# Patient Record
Sex: MALE | Race: BLACK OR AFRICAN AMERICAN | NOT HISPANIC OR LATINO | URBAN - METROPOLITAN AREA
[De-identification: names, ages, dates, MRNs, and addresses within clinical notes are randomized per-mention and may not be internally consistent; named-entity substitution may affect disease eponyms.]

---

## 2022-10-01 ENCOUNTER — EMERGENCY (EMERGENCY)
Facility: HOSPITAL | Age: 42
LOS: 1 days | Discharge: HOME | End: 2022-10-01
Attending: STUDENT IN AN ORGANIZED HEALTH CARE EDUCATION/TRAINING PROGRAM
Payer: MEDICAID

## 2022-10-01 VITALS
OXYGEN SATURATION: 97 % | DIASTOLIC BLOOD PRESSURE: 137 MMHG | SYSTOLIC BLOOD PRESSURE: 171 MMHG | RESPIRATION RATE: 18 BRPM | TEMPERATURE: 99 F | HEART RATE: 116 BPM

## 2022-10-01 VITALS
SYSTOLIC BLOOD PRESSURE: 150 MMHG | OXYGEN SATURATION: 98 % | DIASTOLIC BLOOD PRESSURE: 99 MMHG | RESPIRATION RATE: 18 BRPM | HEART RATE: 91 BPM | TEMPERATURE: 98 F

## 2022-10-01 PROCEDURE — 73700 CT LOWER EXTREMITY W/O DYE: CPT | Mod: 26,LT,MA

## 2022-10-01 PROCEDURE — 27780 TREATMENT OF FIBULA FRACTURE: CPT | Mod: 54,LT

## 2022-10-01 PROCEDURE — 73030 X-RAY EXAM OF SHOULDER: CPT

## 2022-10-01 PROCEDURE — 73610 X-RAY EXAM OF ANKLE: CPT | Mod: 26,LT

## 2022-10-01 PROCEDURE — 99285 EMERGENCY DEPT VISIT HI MDM: CPT | Mod: 25

## 2022-10-01 PROCEDURE — 29515 APPLICATION SHORT LEG SPLINT: CPT | Mod: LT

## 2022-10-01 PROCEDURE — 73610 X-RAY EXAM OF ANKLE: CPT

## 2022-10-01 PROCEDURE — 73630 X-RAY EXAM OF FOOT: CPT | Mod: 26,LT

## 2022-10-01 PROCEDURE — 73030 X-RAY EXAM OF SHOULDER: CPT | Mod: 26,LT

## 2022-10-01 PROCEDURE — 90471 IMMUNIZATION ADMIN: CPT

## 2022-10-01 PROCEDURE — 90715 TDAP VACCINE 7 YRS/> IM: CPT

## 2022-10-01 PROCEDURE — 99285 EMERGENCY DEPT VISIT HI MDM: CPT | Mod: 57

## 2022-10-01 PROCEDURE — 73630 X-RAY EXAM OF FOOT: CPT

## 2022-10-01 PROCEDURE — 73700 CT LOWER EXTREMITY W/O DYE: CPT | Mod: MA

## 2022-10-01 RX ORDER — TETANUS TOXOID, REDUCED DIPHTHERIA TOXOID AND ACELLULAR PERTUSSIS VACCINE, ADSORBED 5; 2.5; 8; 8; 2.5 [IU]/.5ML; [IU]/.5ML; UG/.5ML; UG/.5ML; UG/.5ML
0.5 SUSPENSION INTRAMUSCULAR ONCE
Refills: 0 | Status: COMPLETED | OUTPATIENT
Start: 2022-10-01 | End: 2022-10-01

## 2022-10-01 RX ORDER — IBUPROFEN 200 MG
600 TABLET ORAL ONCE
Refills: 0 | Status: COMPLETED | OUTPATIENT
Start: 2022-10-01 | End: 2022-10-01

## 2022-10-01 RX ADMIN — Medication 600 MILLIGRAM(S): at 14:10

## 2022-10-01 RX ADMIN — TETANUS TOXOID, REDUCED DIPHTHERIA TOXOID AND ACELLULAR PERTUSSIS VACCINE, ADSORBED 0.5 MILLILITER(S): 5; 2.5; 8; 8; 2.5 SUSPENSION INTRAMUSCULAR at 13:39

## 2022-10-01 RX ADMIN — Medication 600 MILLIGRAM(S): at 13:40
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
## 2022-10-01 NOTE — ED PROVIDER NOTE - NS ED ROS FT
Review of Systems    Constitutional: (-) fever, (-) chills, (-) fatigue  Cardiovascular: (-) chest pain, (-) syncope  Respiratory: (-) cough, (-) shortness of breath  Gastrointestinal: (-) vomiting, (-) diarrhea, (-) abdominal pain  Musculoskeletal: (-) neck pain, (-) back pain, (+) ankle pain  Integumentary: (-) rash, (-) edema, (-) wound  Neurological: (-) headache, (-) altered mental status    Except as documented in the HPI, all other systems are negative.

## 2022-10-01 NOTE — ED PROVIDER NOTE - PHYSICAL EXAMINATION
CONSTITUTIONAL: non-toxic, well appearing  SKIN: no rash, no petechiae.  EYES: PERRL, EOMI, pink conjunctiva, anicteric  ENT: tongue and uvular midline, no exudates, moist mucous membranes  NECK: Supple; no meningismus, no JVD  CARD: RRR, no murmurs, equal radial pulses bilaterally 2+  RESP: CTAB, no respiratory distress  ABD: Soft, non-tender, non-distended, no peritoneal signs, no CVA tenderness  EXT: Normal ROM x4. No edema. Tender over medial malleolus.   NEURO: Alert, oriented. Neuro exam nonfocal.  PSYCH: Cooperative, appropriate.

## 2022-10-01 NOTE — ED PROVIDER NOTE - NSFOLLOWUPINSTRUCTIONS_ED_ALL_ED_FT
Fracture    A fracture is a break in one of your bones. This can occur from a variety of injuries, especially traumatic ones. Symptoms include pain, bruising, or swelling. Do not use the injured limb. If a fracture is in one of the bones below your waist, do not put weight on that limb unless instructed to do so by your healthcare provider. Crutches or a cane may have been provided. A splint or cast may have been applied by your health care provider. Make sure to keep it dry and follow up with an orthopedist as instructed.    Take over the counter acetaminophen (Tylenol) 650-1000 mg every 4-6 hours as needed for pain. Do not take more than 3000 mg in a 24 hour period. Be aware many over the counter and prescription medications also contain acetaminophen (Tylenol).     Take over the counter ibuprofen 400-600 mg every 6 hours with food as needed for pain.  Do not take these medications if you do not have pain or if you have any history of bleeding disorder or, kidney disease. Do not use ibuprofen if you are on blood thinners (anti-coagulation).     Follow up with podiatry as discussed.     SEEK IMMEDIATE MEDICAL CARE IF YOU HAVE ANY OF THE FOLLOWING SYMPTOMS: numbness, tingling, increasing pain, or weakness in any part of the injured limb.

## 2022-10-01 NOTE — ED PROVIDER NOTE - PATIENT PORTAL LINK FT
You can access the FollowMyHealth Patient Portal offered by [facility] by registering at the following website: [email or web address]. By joining [employer]’s FollowMyHealth portal, you will also be able to view your health information using other applications (apps) compatible with our system.

## 2022-10-01 NOTE — ED PROVIDER NOTE - PROGRESS NOTE DETAILS
Lucks-DO: posterior splint applied, crutches provided. Will DC with podiatry follow up with return precautions.
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
## 2023-03-13 NOTE — ED ADULT NURSE NOTE - CAS TRG GEN SKIN COLOR
Normal for race Gabapentin Counseling: I discussed with the patient the risks of gabapentin including but not limited to dizziness, somnolence, fatigue and ataxia.

## 2023-06-21 ENCOUNTER — EMERGENCY (EMERGENCY)
Facility: HOSPITAL | Age: 43
LOS: 1 days | Discharge: HOME | End: 2023-06-21
Attending: EMERGENCY MEDICINE | Admitting: EMERGENCY MEDICINE
Payer: MEDICAID

## 2023-06-21 VITALS
HEART RATE: 71 BPM | TEMPERATURE: 98 F | SYSTOLIC BLOOD PRESSURE: 127 MMHG | RESPIRATION RATE: 16 BRPM | OXYGEN SATURATION: 100 % | DIASTOLIC BLOOD PRESSURE: 80 MMHG

## 2023-06-21 LAB
ALBUMIN SERPL ELPH-MCNC: 4.6 G/DL — SIGNIFICANT CHANGE UP (ref 3.3–5)
ALP SERPL-CCNC: 116 U/L — SIGNIFICANT CHANGE UP (ref 40–120)
ALT FLD-CCNC: 24 U/L — SIGNIFICANT CHANGE UP (ref 4–41)
ANION GAP SERPL CALC-SCNC: 18 MMOL/L — HIGH (ref 7–14)
APAP SERPL-MCNC: <10 UG/ML — LOW (ref 15–25)
AST SERPL-CCNC: 30 U/L — SIGNIFICANT CHANGE UP (ref 4–40)
BASOPHILS # BLD AUTO: 0.03 K/UL — SIGNIFICANT CHANGE UP (ref 0–0.2)
BASOPHILS NFR BLD AUTO: 0.4 % — SIGNIFICANT CHANGE UP (ref 0–2)
BILIRUB SERPL-MCNC: 1.1 MG/DL — SIGNIFICANT CHANGE UP (ref 0.2–1.2)
BUN SERPL-MCNC: 14 MG/DL — SIGNIFICANT CHANGE UP (ref 7–23)
CALCIUM SERPL-MCNC: 9.5 MG/DL — SIGNIFICANT CHANGE UP (ref 8.4–10.5)
CHLORIDE SERPL-SCNC: 102 MMOL/L — SIGNIFICANT CHANGE UP (ref 98–107)
CO2 SERPL-SCNC: 22 MMOL/L — SIGNIFICANT CHANGE UP (ref 22–31)
CREAT SERPL-MCNC: 0.98 MG/DL — SIGNIFICANT CHANGE UP (ref 0.5–1.3)
EGFR: 100 ML/MIN/1.73M2 — SIGNIFICANT CHANGE UP
EOSINOPHIL # BLD AUTO: 0.39 K/UL — SIGNIFICANT CHANGE UP (ref 0–0.5)
EOSINOPHIL NFR BLD AUTO: 5.2 % — SIGNIFICANT CHANGE UP (ref 0–6)
ETHANOL SERPL-MCNC: 102 MG/DL — HIGH
GLUCOSE SERPL-MCNC: 110 MG/DL — HIGH (ref 70–99)
HCT VFR BLD CALC: 45.9 % — SIGNIFICANT CHANGE UP (ref 39–50)
HGB BLD-MCNC: 14.9 G/DL — SIGNIFICANT CHANGE UP (ref 13–17)
IANC: 4.58 K/UL — SIGNIFICANT CHANGE UP (ref 1.8–7.4)
IMM GRANULOCYTES NFR BLD AUTO: 0.5 % — SIGNIFICANT CHANGE UP (ref 0–0.9)
LYMPHOCYTES # BLD AUTO: 1.69 K/UL — SIGNIFICANT CHANGE UP (ref 1–3.3)
LYMPHOCYTES # BLD AUTO: 22.7 % — SIGNIFICANT CHANGE UP (ref 13–44)
MCHC RBC-ENTMCNC: 27.9 PG — SIGNIFICANT CHANGE UP (ref 27–34)
MCHC RBC-ENTMCNC: 32.5 GM/DL — SIGNIFICANT CHANGE UP (ref 32–36)
MCV RBC AUTO: 86 FL — SIGNIFICANT CHANGE UP (ref 80–100)
MONOCYTES # BLD AUTO: 0.7 K/UL — SIGNIFICANT CHANGE UP (ref 0–0.9)
MONOCYTES NFR BLD AUTO: 9.4 % — SIGNIFICANT CHANGE UP (ref 2–14)
NEUTROPHILS # BLD AUTO: 4.58 K/UL — SIGNIFICANT CHANGE UP (ref 1.8–7.4)
NEUTROPHILS NFR BLD AUTO: 61.8 % — SIGNIFICANT CHANGE UP (ref 43–77)
NRBC # BLD: 0 /100 WBCS — SIGNIFICANT CHANGE UP (ref 0–0)
NRBC # FLD: 0 K/UL — SIGNIFICANT CHANGE UP (ref 0–0)
PLATELET # BLD AUTO: 316 K/UL — SIGNIFICANT CHANGE UP (ref 150–400)
POTASSIUM SERPL-MCNC: 3.4 MMOL/L — LOW (ref 3.5–5.3)
POTASSIUM SERPL-SCNC: 3.4 MMOL/L — LOW (ref 3.5–5.3)
PROT SERPL-MCNC: 7.6 G/DL — SIGNIFICANT CHANGE UP (ref 6–8.3)
RBC # BLD: 5.34 M/UL — SIGNIFICANT CHANGE UP (ref 4.2–5.8)
RBC # FLD: 13.7 % — SIGNIFICANT CHANGE UP (ref 10.3–14.5)
SALICYLATES SERPL-MCNC: <0.3 MG/DL — LOW (ref 15–30)
SODIUM SERPL-SCNC: 142 MMOL/L — SIGNIFICANT CHANGE UP (ref 135–145)
TOXICOLOGY SCREEN, DRUGS OF ABUSE, SERUM RESULT: SIGNIFICANT CHANGE UP
TSH SERPL-MCNC: 0.22 UIU/ML — LOW (ref 0.27–4.2)
WBC # BLD: 7.43 K/UL — SIGNIFICANT CHANGE UP (ref 3.8–10.5)
WBC # FLD AUTO: 7.43 K/UL — SIGNIFICANT CHANGE UP (ref 3.8–10.5)

## 2023-06-21 PROCEDURE — 99285 EMERGENCY DEPT VISIT HI MDM: CPT

## 2023-06-21 RX ORDER — HALOPERIDOL DECANOATE 100 MG/ML
5 INJECTION INTRAMUSCULAR ONCE
Refills: 0 | Status: COMPLETED | OUTPATIENT
Start: 2023-06-21 | End: 2023-06-21

## 2023-06-21 RX ORDER — DIPHENHYDRAMINE HCL 50 MG
50 CAPSULE ORAL ONCE
Refills: 0 | Status: COMPLETED | OUTPATIENT
Start: 2023-06-21 | End: 2023-06-21

## 2023-06-21 RX ADMIN — Medication 2 MILLIGRAM(S): at 21:00

## 2023-06-21 RX ADMIN — HALOPERIDOL DECANOATE 5 MILLIGRAM(S): 100 INJECTION INTRAMUSCULAR at 21:00

## 2023-06-21 RX ADMIN — Medication 50 MILLIGRAM(S): at 21:00
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
## 2023-06-21 NOTE — ED ADULT TRIAGE NOTE - CHIEF COMPLAINT QUOTE
Pt arrives handcuffed not under arrest with x2 [facility] officers and EMS. pt found running in and out of traffic and sleeping in resturants. As per EMS possibly intoxication/drug ingestion. Pt uncooperative with staff, screaming thrashing in stretcher. MD cho aware pt to be taken to  for medications. Unable to obtain VS, FS. Pt arrives handcuffed not under arrest with x2 [facility] officers and EMS. pt found running in and out of traffic and sleeping in resturants. As per EMS possibly intoxication/drug ingestion. Pt uncooperative with staff, screaming thrashing in stretcher. MD cho aware pt to be taken to  for medications. Unable to obtain VS, FS. Pt arrives handcuffed not under arrest with x2 [facility] officers and EMS. pt found running in and out of traffic and sleeping in resturants. As per EMS possibly intoxication/drug ingestion. Pt uncooperative with staff, screaming thrashing in stretcher. MD cho aware pt to be taken to  for medications. Unable to obtain VS, FS. Pt arrives handcuffed not under arrest with x2 [facility] officers and EMS. pt found running in and out of traffic and sleeping in resturants. As per EMS possibly intoxication/drug ingestion. Pt uncooperative with staff, screaming thrashing in stretcher, spitting. MD cho aware pt to be taken to  for medications. Unable to obtain VS, FS. Pt arrives handcuffed not under arrest with x2 [facility] officers and EMS. pt found running in and out of traffic and sleeping in resturants. As per EMS possibly intoxication/drug ingestion. Pt uncooperative with staff, screaming thrashing in stretcher, spitting. MD cho aware pt to be taken to  for medications. Unable to obtain VS, FS. Pt arrives handcuffed not under arrest with x2 [facility] officers and EMS. pt found running in and out of traffic and sleeping in resturants. As per EMS possibly intoxication/drug ingestion. Pt uncooperative with staff, screaming thrashing in stretcher, spitting. MD cho aware pt to be taken to  for medications. Unable to obtain VS, FS.

## 2023-06-21 NOTE — ED ADULT TRIAGE NOTE - NS ED NURSE AMBULANCES
[facility]

## 2023-06-21 NOTE — ED PROVIDER NOTE - NSFOLLOWUPINSTRUCTIONS_ED_ALL_ED_FT
Follow up with your PMD within 48-72 hrs. Show copies of your reports given to you. Take all of your medications as previously prescribed.    If you have any new, worsened or concerning complaints, please return to the emergency department immediately.
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
## 2023-06-21 NOTE — ED PROVIDER NOTE - PATIENT PORTAL LINK FT
You can access the FollowMyHealth Patient Portal offered by [facility] by registering at the following website: [email or web address]. By joining "[employer]’s FollowMyHealth portal, you will also be able to view your health information using other applications (apps) compatible with our system. You can access the FollowMyHealth Patient Portal offered by [facility] by registering at the following website: [email or web address]. By joining [employer]’s FollowMyHealth portal, you will also be able to view your health information using other applications (apps) compatible with our system. You can access the FollowMyHealth Patient Portal offered by [facility] by registering at the following website: [email or web address]. By joining [employer]’s FollowMyHealth portal, you will also be able to view your health information using other applications (apps) compatible with our system.

## 2023-06-21 NOTE — ED ADULT NURSE NOTE - CHIEF COMPLAINT QUOTE
Pt arrives handcuffed not under arrest with x2 [facility] officers and EMS. pt found running in and out of traffic and sleeping in resturants. As per EMS possibly intoxication/drug ingestion. Pt uncooperative with staff, screaming thrashing in stretcher. MD cho aware pt to be taken to  for medications. Unable to obtain VS, FS. Pt arrives handcuffed not under arrest with x2 [facility] officers and EMS. pt found running in and out of traffic and sleeping in resturants. As per EMS possibly intoxication/drug ingestion. Pt uncooperative with staff, screaming thrashing in stretcher. MD cho aware pt to be taken to  for medications. Unable to obtain VS, FS. Pt arrives handcuffed not under arrest with x2 [facility] officers and EMS. pt found running in and out of traffic and sleeping in resturants. As per EMS possibly intoxication/drug ingestion. Pt uncooperative with staff, screaming thrashing in stretcher. MD cho aware pt to be taken to  for medications. Unable to obtain VS, FS.
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
## 2023-06-21 NOTE — ED PROVIDER NOTE - CLINICAL SUMMARY MEDICAL DECISION MAKING FREE TEXT BOX
42-year-old male brought in by EMS in handcuffs accompanied by [employer] for agitated behavior bizarre behavior.  Uncooperative in triage aggressive towards staff.  Substance abuse versus psychosis.  Will obtain CBC CMP TSH tox screen EKG give medication to treat agitation and reassess.

## 2023-06-22 VITALS
SYSTOLIC BLOOD PRESSURE: 167 MMHG | TEMPERATURE: 98 F | RESPIRATION RATE: 16 BRPM | OXYGEN SATURATION: 99 % | DIASTOLIC BLOOD PRESSURE: 94 MMHG | HEART RATE: 77 BPM

## 2023-06-22 DIAGNOSIS — F19.90 OTHER PSYCHOACTIVE SUBSTANCE USE, UNSPECIFIED, UNCOMPLICATED: ICD-10-CM

## 2023-06-22 DIAGNOSIS — F29 UNSPECIFIED PSYCHOSIS NOT DUE TO A SUBSTANCE OR KNOWN PHYSIOLOGICAL CONDITION: ICD-10-CM

## 2023-06-22 LAB
FLUAV AG NPH QL: SIGNIFICANT CHANGE UP
FLUBV AG NPH QL: SIGNIFICANT CHANGE UP
RSV RNA NPH QL NAA+NON-PROBE: SIGNIFICANT CHANGE UP
SARS-COV-2 RNA SPEC QL NAA+PROBE: SIGNIFICANT CHANGE UP

## 2023-06-22 PROCEDURE — 99285 EMERGENCY DEPT VISIT HI MDM: CPT

## 2023-06-22 NOTE — ED BEHAVIORAL HEALTH ASSESSMENT NOTE - NS ED BHA PLAN TR MEDICATIONS2 FT
adviced to consult with his PCP re: his multiple medical co-morbidities. will need maintenance meds. consult with sleep specialist

## 2023-06-22 NOTE — ED BEHAVIORAL HEALTH ASSESSMENT NOTE - OTHER PAST PSYCHIATRIC HISTORY (INCLUDE DETAILS REGARDING ONSET, COURSE OF ILLNESS, INPATIENT/OUTPATIENT TREATMENT)
per Psyckes: MDD  no listed in-Pt psych admissions including Psych ED or CPEP visits here in [place]  brother denied any SA  currently, not receiving any psych care per Psyckes

## 2023-06-22 NOTE — ED BEHAVIORAL HEALTH ASSESSMENT NOTE - NSBHMSEJUDGE_PSY_A_CORE
initially, impaired. currently as of 3:08PM, unable to assess initially, impaired. currently as of 3:08PM, unable to assess. ON RE-EVALUATION AT 5:55PM:  improved

## 2023-06-22 NOTE — ED BEHAVIORAL HEALTH ASSESSMENT NOTE - NSBHMSEBEHAV_PSY_A_CORE
initially hostile and uncooperative. initially hostile and uncooperative. ON RE-EVALUATION AT 5:55PM:  more cooperative, calm

## 2023-06-22 NOTE — ED BEHAVIORAL HEALTH ASSESSMENT NOTE - HPI (INCLUDE ILLNESS QUALITY, SEVERITY, DURATION, TIMING, CONTEXT, MODIFYING FACTORS, ASSOCIATED SIGNS AND SYMPTOMS)
42 yr old male who is reportedly unemployed and with unclear domicility (Per brother: Pt had been in a shelter x 3 months ago; then found a room in [place] - subsequently left and reportedly was planning to go back to a shelter in the [place]).  has unknown past psych hx but per Psyckes: has hx of MDD. no documented hx of in-Pt psych admissions nor any Psych ED/ CPEP attendances -  per Psyckes for the past 5 yrs.  per Brother, there is no hx of suicide attempts. Brother further reported hx of polysubstance use (ecstasy, alcohol, and cocaine).  Presented to the ED last night BIB EMS due to agitation and "bizarre behavior".  Per EMS personnel, Pt reportedly had been running in and out of traffic.  as police came and attempted to engage the Pt, Pt reportedly ran into a restaurant and refused to leave.  on initial presentation at the triage, the Pt presented aggressively towards staff; was uncooperative.  refused vital signs; He reportedly spat towards staff.  Pt reportedly was increasingly agitated, aggressive. despite multiple verbal redirecting, Pt's behavior was escalating. all the least restrictive efforts proved futile.  Pt was eventually medicated with Haldol 5mg IM + ativan 2mg IM + benadryl 50mg IM at 8:20PM with good effects.       after the initial aggressive behavior leading to Pt being medicated PRN IM, there was no other reported recurrence of agitation/aggressive behavior.  pertinent labs obtained.  Pt was observed at the  ED.  on subsequent re-evaluation by ED attending (5:57AM), Pt stated he lives in [place].  he was noted to be somnolent, arousable.  however, not fully engageable as he continued to fall asleep. he was however, able to provide some account of prior events leading to this ED visit.  Pt reported that he was running around the street AFTER DRINKING.  He verbalized to ED attending that he was depressed.  subject of SI was explored and Pt reportedly hesitantly nodded.      at around 9:35PM, another attempt to engage the Pt towards a meaningful evaluation proved futile.  He was still sleeping deeply.      per  ED AM/ evening shift staff, the Pt just once got up, requested for water then went back to sleep again.  there was no reported recurrence of agitated behavior. no verbalization of active SI nor HI. no reported bizarre behavior.  per  ED medical provider and  ED RNs,  there is nothing suggestive that Pt is manifesting any signs/ symptoms suggestive of impending withdrawal from alcohol.      at 3:08PM, WRITER ATTEMPTED TO ENGAGE PATIENT BEDSIDE. HE REMAINS ASLEEP.      COLLATERAL INFORMATION WAS OBTAINED FROM HIS MOTHER AND BROTHER:  Mother reported that she suspects that the pt has not been sleeping well for the past 2 nights; added that Pt sometimes drinks alcohol.     whereas Pt's brother reported that Pt has been staying with him for the past 2 days.  Pt has hx of using ectasy, alcohol and cocaine.  Yesterday, he adviced Pt to go into a drug treatment program at [facility] . However, Pt left this facility.  Pt reportedly has sleep disturbance (not sleeping well).  brother claimed that Pt has been exhibiting paranoia - for the past week, Pt has been saying that people are trying to kill him.  there is no reported hx of violence or aggression; no active SI/HI 42 yr old male who is reportedly unemployed and with unclear domicility (Per brother: Pt had been in a shelter x 3 months ago; then found a room in [place] - subsequently left and reportedly was planning to go back to a shelter in the [place]).  has unknown past psych hx but per Psyckes: has hx of MDD. no documented hx of in-Pt psych admissions nor any Psych ED/ CPEP attendances -  per Psyckes for the past 5 yrs.  per Brother, there is no hx of suicide attempts. Brother further reported hx of polysubstance use (ecstasy, alcohol, and cocaine).  Presented to the ED last night BIB EMS due to agitation and "bizarre behavior".  Per EMS personnel, Pt reportedly had been running in and out of traffic.  as police came and attempted to engage the Pt, Pt reportedly ran into a restaurant and refused to leave.  on initial presentation at the triage, the Pt presented aggressively towards staff; was uncooperative.  refused vital signs; He reportedly spat towards staff.  Pt reportedly was increasingly agitated, aggressive. despite multiple verbal redirecting, Pt's behavior was escalating. all the least restrictive efforts proved futile.  Pt was eventually medicated with Haldol 5mg IM + ativan 2mg IM + benadryl 50mg IM at 8:20PM with good effects.       after the initial aggressive behavior leading to Pt being medicated PRN IM, there was no other reported recurrence of agitation/aggressive behavior.  pertinent labs obtained.  Pt was observed at the  ED.  on subsequent re-evaluation by ED attending (5:57AM), Pt stated he lives in [place].  he was noted to be somnolent, arousable.  however, not fully engageable as he continued to fall asleep. he was however, able to provide some account of prior events leading to this ED visit.  Pt reported that he was running around the street AFTER DRINKING.  He verbalized to ED attending that he was depressed.  subject of SI was explored and Pt reportedly hesitantly nodded.      at around 9:35PM, another attempt to engage the Pt towards a meaningful evaluation proved futile.  He was still sleeping deeply.      per  ED AM/ evening shift staff, the Pt just once got up, requested for water then went back to sleep again.  there was no reported recurrence of agitated behavior. no verbalization of active SI nor HI. no reported bizarre behavior.  per  ED medical provider and  ED RNs,  there is nothing suggestive that Pt is manifesting any signs/ symptoms suggestive of impending withdrawal from alcohol.      at 3:08PM, WRITER ATTEMPTED TO ENGAGE PATIENT BEDSIDE. HE REMAINS ASLEEP.      COLLATERAL INFORMATION WAS OBTAINED FROM HIS MOTHER AND BROTHER:  Mother reported that she suspects that the pt has not been sleeping well for the past 2 nights; added that Pt sometimes drinks alcohol.     whereas Pt's brother reported that Pt has been staying with him for the past 2 days.  Pt has hx of using ectasy, alcohol and cocaine.  Yesterday, he adviced Pt to go into a drug treatment program at [facility] . However, Pt left this facility.  Pt reportedly has sleep disturbance (not sleeping well).  brother claimed that Pt has been exhibiting paranoia - for the past week, Pt has been saying that people are trying to kill him.  there is no reported hx of violence or aggression; no active SI/HI 42 yr old male who is reportedly unemployed and with unclear domicility (Per brother: Pt had been in a shelter x 3 months ago; then found a room in [place] - subsequently left and reportedly was planning to go back to a shelter in the [place]).  has unknown past psych hx but per Psyckes: has hx of MDD. no documented hx of in-Pt psych admissions nor any Psych ED/ CPEP attendances -  per Psyckes for the past 5 yrs.  per Brother, there is no hx of suicide attempts. Brother further reported hx of polysubstance use (ecstasy, alcohol, and cocaine).  Presented to the ED last night BIB EMS due to agitation and "bizarre behavior".  Per EMS personnel, Pt reportedly had been running in and out of traffic.  as police came and attempted to engage the Pt, Pt reportedly ran into a restaurant and refused to leave.  on initial presentation at the triage, the Pt presented aggressively towards staff; was uncooperative.  refused vital signs; He reportedly spat towards staff.  Pt reportedly was increasingly agitated, aggressive. despite multiple verbal redirecting, Pt's behavior was escalating. all the least restrictive efforts proved futile.  Pt was eventually medicated with Haldol 5mg IM + ativan 2mg IM + benadryl 50mg IM at 8:20PM with good effects.       after the initial aggressive behavior leading to Pt being medicated PRN IM, there was no other reported recurrence of agitation/aggressive behavior.  pertinent labs obtained.  Pt was observed at the  ED.  on subsequent re-evaluation by ED attending (5:57AM), Pt stated he lives in [place].  he was noted to be somnolent, arousable.  however, not fully engageable as he continued to fall asleep. he was however, able to provide some account of prior events leading to this ED visit.  Pt reported that he was running around the street AFTER DRINKING.  He verbalized to ED attending that he was depressed.  subject of SI was explored and Pt reportedly hesitantly nodded.      at around 9:35PM, another attempt to engage the Pt towards a meaningful evaluation proved futile.  He was still sleeping deeply.      per  ED AM/ evening shift staff, the Pt just once got up, requested for water then went back to sleep again.  there was no reported recurrence of agitated behavior. no verbalization of active SI nor HI. no reported bizarre behavior.  per  ED medical provider and  ED RNs,  there is nothing suggestive that Pt is manifesting any signs/ symptoms suggestive of impending withdrawal from alcohol.      at 3:08PM, WRITER ATTEMPTED TO ENGAGE PATIENT BEDSIDE. HE REMAINS ASLEEP.      COLLATERAL INFORMATION WAS OBTAINED FROM HIS MOTHER AND BROTHER:  Mother reported that she suspects that the pt has not been sleeping well for the past 2 nights; added that Pt sometimes drinks alcohol.     whereas Pt's brother reported that Pt has been staying with him for the past 2 days.  Pt has hx of using ectasy, alcohol and cocaine.  Yesterday, he adviced Pt to go into a drug treatment program at [facility] . However, Pt left this facility.  Pt reportedly has sleep disturbance (not sleeping well).  brother claimed that Pt has been exhibiting paranoia - for the past week, Pt has been saying that people are trying to kill him.  there is no reported hx of violence or aggression; no active SI/HI

## 2023-06-22 NOTE — ED BEHAVIORAL HEALTH ASSESSMENT NOTE - NSBHMSEAFFRANGE_PSY_A_CORE
initially, labile. currently as of 3:08PM, unable to assess initially, labile. currently as of 3:08PM, unable to assess. ON RE-EVALUATION AT 5:55PM:  full affect

## 2023-06-22 NOTE — ED BEHAVIORAL HEALTH NOTE - BEHAVIORAL HEALTH NOTE
[facility]  Reference #: [id number] - no controlled substances prescribed     NO ATTENDANCES MADE AT [facility] AS THERE WAS NO YIELD ON CVM    NO PENDING LEGAL ISSUES PER [facility] UNIFIED COURT SYSTEM/ WEBClaritureS SITE  NO INCARCERATION PER Department of Corrections and Community Supervision Incarcerated Lookup  [email or web address]    DETAILS PER PSYCKES: (OBTAINED WITHIN THE PAST 5 YEARS)  YULY CARRSAQUILLO DEDE   Medicaid ID # [id number]   : 1980  Medicaid Aid Category SAFETY NET W/O DEPRIV [facility] Care [facility] ([facility])  Address Listed at:   [address], [address]  Medicaid Eligibility Expires on 2023     ALERTS  Homelessness - [facility]  2022 [place] Families with Children  Homelessness - [facility]  [address] MEN'S SHELTER  Homelessness - [facility]  2022 AKIL Single  Homelessness - [facility]  [address]    Behavioral Health Diagnoses  - NO ESTABLISHED PSYCHOTIC DISORDER NOR ANY BIPOLARITY LISTED   PER Psyckes, ONLY Cocaine related disorders  and Major Depressive Disorder    Medical issues:  Angina pectoris, Heart failure, Other pulmonary heart diseases, Hypertensive heart disease, Other conduction disorders  Breathing Related Sleep Disorder  DM Type 2   hx of Fracture of lower leg, including ankle    Meds past listed were:  Lisinopril 10mg last picked up on 2022  Metformin Hcl 500mg BID last picked up on 2022  NO PSYCHOTROPIC MEDS     CARE COORDINATION  [facility] [place] [facility]  since 3/1/2023  - Current    NO PSYCH CARE LISTED PER PSYCKES  NO IN-PATIENT NOR ANY CPEP/ BEHAVIORAL HEALTH ED ATTENDANCES LISTED FOR THE PAST 5 YRS PER PSYCKES [facility]  Reference #: [id number] - no controlled substances prescribed     NO ATTENDANCES MADE AT [facility] AS THERE WAS NO YIELD ON CVM    NO PENDING LEGAL ISSUES PER [facility] UNIFIED COURT SYSTEM/ WEBMibioS SITE  NO INCARCERATION PER Department of Corrections and Community Supervision Incarcerated Lookup  [email or web address]    DETAILS PER PSYCKES: (OBTAINED WITHIN THE PAST 5 YEARS)  YULY CARRASQUILLO DEDE   Medicaid ID # [id number]   : 1980  Medicaid Aid Category SAFETY NET W/O DEPRIV [facility] Care [facility] ([facility])  Address Listed at:   [address], [address]  Medicaid Eligibility Expires on 2023     ALERTS  Homelessness - [facility]  2022 [place] Families with Children  Homelessness - [facility]  [address] MEN'S SHELTER  Homelessness - [facility]  2022 AKIL Single  Homelessness - [facility]  [address]    Behavioral Health Diagnoses  - NO ESTABLISHED PSYCHOTIC DISORDER NOR ANY BIPOLARITY LISTED   PER Psyckes, ONLY Cocaine related disorders  and Major Depressive Disorder    Medical issues:  Angina pectoris, Heart failure, Other pulmonary heart diseases, Hypertensive heart disease, Other conduction disorders  Breathing Related Sleep Disorder  DM Type 2   hx of Fracture of lower leg, including ankle    Meds past listed were:  Lisinopril 10mg last picked up on 2022  Metformin Hcl 500mg BID last picked up on 2022  NO PSYCHOTROPIC MEDS     CARE COORDINATION  [facility]  since 3/1/2023  - Current    NO PSYCH CARE LISTED PER PSYCKES  NO IN-PATIENT NOR ANY CPEP/ BEHAVIORAL HEALTH ED ATTENDANCES LISTED FOR THE PAST 5 YRS PER PSYCKES [facility]  Reference #: [id number] - no controlled substances prescribed     NO ATTENDANCES MADE AT [facility] AS THERE WAS NO YIELD ON CVM    NO PENDING LEGAL ISSUES PER [facility] UNIFIED COURT SYSTEM/ WEBAchillion PharmaceuticalsS SITE  NO INCARCERATION PER Department of Corrections and Community Supervision Incarcerated Lookup  [email or web address]    DETAILS PER PSYCKES: (OBTAINED WITHIN THE PAST 5 YEARS)  YULY CARRASQUILLO DEDE   Medicaid ID # [id number]   : 1980  Medicaid Aid Category SAFETY NET W/O DEPRIV [facility] Care [facility] ([facility])  Address Listed at:   [address], [address]  Medicaid Eligibility Expires on 2023     ALERTS  Homelessness - [facility]  2022 [place] Families [place] Children  Homelessness - [facility]  [address] MEN'S SHELTER  Homelessness - [facility]  2022 AKIL Single  Homelessness - [facility]  [address]    Behavioral Health Diagnoses  - NO ESTABLISHED PSYCHOTIC DISORDER NOR ANY BIPOLARITY LISTED   PER Psyckes, ONLY Cocaine related disorders  and Major Depressive Disorder    Medical issues:  Angina pectoris, Heart failure, Other pulmonary heart diseases, Hypertensive heart disease, Other conduction disorders  Breathing Related Sleep Disorder  DM Type 2   hx of Fracture of lower leg, including ankle    Meds past listed were:  Lisinopril 10mg last picked up on 2022  Metformin Hcl 500mg BID last picked up on 2022  NO PSYCHOTROPIC MEDS     CARE COORDINATION  [facility]  since 3/1/2023  - Current    NO PSYCH CARE LISTED PER PSYCKES  NO IN-PATIENT NOR ANY CPEP/ BEHAVIORAL HEALTH ED ATTENDANCES LISTED FOR THE PAST 5 YRS PER PSYCKES

## 2023-06-22 NOTE — ED BEHAVIORAL HEALTH ASSESSMENT NOTE - PERSONAL COLLATERAL NAME
see separate [facility] notes for collateral information obtained see separate [facility] notes for collateral information obtained see separate [facility] notes for collateral information obtained

## 2023-06-22 NOTE — ED BEHAVIORAL HEALTH ASSESSMENT NOTE - NSBHATTESTBILLING_PSY_A_CORE
99285-Emergency department visit - high complexity 48210-Dimtdwtfttf diagnostic evaluation with medical services 29717-Tbwpejfbsof diagnostic evaluation with medical services 98937-Bxuqyktbjow diagnostic evaluation with medical services

## 2023-06-22 NOTE — ED BEHAVIORAL HEALTH ASSESSMENT NOTE - NSBHMSEATTEN_PSY_A_CORE
yes currently as of 3:08PM, unable to assess currently as of 3:08PM, unable to assess. ON RE-EVALUATION AT 5:55PM:  distracted but redirectable

## 2023-06-22 NOTE — ED BEHAVIORAL HEALTH NOTE - BEHAVIORAL HEALTH NOTE
As per provider, worker contacted patient’s mother Vanessa ([phone]) for collateral information. All information is as per Vanessa:    Vanessa states that she spoke to the patient yesterday and she states that the patient sounded like he did not sleep for two nights. Patient sometimes drinks alcohol. Patient’s mother states that she tried to locate him last night but could not find him.  Vanessa states to contact patient’s brother.   Writer called patient’s brother Sumeet ([phone]) for collateral information. All information is as per brother:    Brother states that the patient has not been doing well mentally. Patient asked to go to a drug treatment program/ mental health program yesterday. Brother uber patient to [facility] for their drug treatment program- ATC. Brother states that the patient just left and did not want to go. Patient has been using ecstasy, alcohol, and cocaine (unsure of the extent). Patient is not working. Patient was in a shelter 3 months ago and then found a room in [place]. Patient then left the room and was planning to go back to the shelter in the [place]. Patient has been staying by his brother's home for the past two days. Patient  has been prior to this not sleeping for two days. Patient was in [place] two months ago and at that time patient was not sleeping. Patient reported at that time of thinking people are trying to kill him. Patient has been saying this for the past week that people are trying to kill him. Patient was last admitted medically at [facility] for heart issues. Patient has been eating at his baseline. Patient is not presenting with SI/HI and has no history of this. Patient is not violent or aggressive. As per provider, worker contacted patient’s mother Vanessa ([phone]) for collateral information. All information is as per Vanessa:    Vanessa states that she spoke to the patient yesterday and she states that the patient sounded like he did not sleep for two nights. Patient sometimes drinks alcohol. Patient’s mother states that she tried to locate him last night but could not find him.  Vanessa states to contact patient’s brother.   Writer called patient’s brother Sumeet ([phone]) for collateral information. All information is as per brother:    Brother states that the patient has not been doing well mentally. Patient asked to go to a drug treatment program/ mental health program yesterday. Brother uber patient to [facility] for their drug treatment program- ATC. Brother states that the patient just left and did not want to go. Patient has been using ecstasy, alcohol, and cocaine (unsure of the extent). Patient is not working. Patient was in a shelter 3 months ago and then found a room in [place]. Patient then left the room and was planning to go back to the shelter in the [place]. Patient has been staying by his brother's home for the past two days. Patient  has been prior to this not sleeping for two days. Patient was in [place] two months ago and at that time patient was not sleeping. Patient reported at that time of thinking people are trying to kill him. Patient has been saying this for the past week that people are trying to kill him. Patient was last admitted medically at [facility] for heart issues. Patient has been eating at his baseline. Patient is not presenting with SI/HI and has no history of this. Patient is not violent or aggressive. As per provider, worker contacted patient’s mother Vanessa ([phone]) for collateral information. All information is as per Vanessa:    Vanessa states that she spoke to the patient yesterday and she states that the patient sounded like he did not sleep for two nights. Patient sometimes drinks alcohol. Patient’s mother states that she tried to locate him last night but could not find him.  Vanessa states to contact patient’s brother.   Writer called patient’s brother Sumeet ([phone]) for collateral information. All information is as per brother:    Brother states that the patient has not been doing well mentally. Patient asked to go to a drug treatment program/ mental health program yesterday. Brother uber patient to [facility] for their drug treatment program- ATC. Brother states that the patient just left and did not want to go. Patient has been using ecstasy, alcohol, and cocaine (unsure of the extent). Patient is not working. Patient was in a shelter 3 months ago and then found a room in [place]. Patient then left the room and was planning to go back to the shelter in the [place]. Patient has been staying by his brother's home for the past two days. Patient  has been prior to this not sleeping for two days. Patient was in [place] two months ago and at that time patient was not sleeping. Patient reported at that time of thinking people are trying to kill him. Patient has been saying this for the past week that people are trying to kill him. Patient was last admitted medically at [facility] for heart issues. Patient has been eating at his baseline. Patient is not presenting with SI/HI and has no history of this. Patient is not violent or aggressive. As per provider, worker contacted patient’s mother Vanessa ([phone]) for collateral information. All information is as per Vanessa:    Vanessa states that she spoke to the patient yesterday and she states that the patient sounded like he did not sleep for two nights. Patient sometimes drinks alcohol. Patient’s mother states that she tried to locate him last night but could not find him.  Vanessa states to contact patient’s brother.   Writer called patient’s brother Sumeet ([phone]) for collateral information. All information is as per brother:    Brother states that the patient has not been doing well mentally. Patient asked to go to a drug treatment program/ mental health program yesterday. Brother uber patient to [facility] for their drug treatment program- ATC. Brother states that the patient just left and did not want to go. Patient has been using ecstasy, alcohol, and cocaine (unsure of the extent). Patient is not working. Patient was in a shelter 3 months ago and then found a room in [place]. Patient then left the room and was planning to go back to the shelter in the [place]. Patient has been staying by his brother's home for the past two days. Patient  has been prior to this not sleeping for two days. Patient was in [place] two months ago and at that time patient was not sleeping. Patient reported at that time of thinking people are trying to kill him. Patient has been saying this for the past week that people are trying to kill him. Patient was last admitted medically at [facility] for heart issues. Patient has been eating at his baseline. Patient is not presenting with SI/HI and has no history of this. Patient is not violent or aggressive. case discussed with psychiatry. As per provider, worker contacted patient’s mother Vanessa ([phone]) for collateral information. All information is as per Vanessa:    Vanessa states that she spoke to the patient yesterday and she states that the patient sounded like he did not sleep for two nights. Patient sometimes drinks alcohol. Patient’s mother states that she tried to locate him last night but could not find him.  Vanessa states to contact patient’s brother.   Writer called patient’s brother Sumeet ([phone]) for collateral information. All information is as per brother:    Brother states that the patient has not been doing well mentally. Patient asked to go to a drug treatment program/ mental health program yesterday. Brother uber patient to [facility] for their drug treatment program- ATC. Brother states that the patient just left and did not want to go. Patient has been using ecstasy, alcohol, and cocaine (unsure of the extent). Patient is not working. Patient was in a shelter 3 months ago and then found a room in [place]. Patient then left the room and was planning to go back to the shelter in the [place]. Patient has been staying by his brother's home for the past two days. Patient  has been prior to this not sleeping for two days. Patient was in [place] two months ago and at that time patient was not sleeping. Patient reported at that time of thinking people are trying to kill him. Patient has been saying this for the past week that people are trying to kill him. Patient was last admitted medically at [facility] for heart issues. Patient has been eating at his baseline. Patient is not presenting with SI/HI and has no history of this. Patient is not violent or aggressive. case discussed with psychiatry. As per provider, worker contacted patient’s mother Vanessa ([phone]) for collateral information. All information is as per Vanessa:    Vanessa states that she spoke to the patient yesterday and she states that the patient sounded like he did not sleep for two nights. Patient sometimes drinks alcohol. Patient’s mother states that she tried to locate him last night but could not find him.  Vanessa states to contact patient’s brother.   Writer called patient’s brother Sumeet ([phone]) for collateral information. All information is as per brother:    Brother states that the patient has not been doing well mentally. Patient asked to go to a drug treatment program/ mental health program yesterday. Brother uber patient to [facility] for their drug treatment program- ATC. Brother states that the patient just left and did not want to go. Patient has been using ecstasy, alcohol, and cocaine (unsure of the extent). Patient is not working. Patient was in a shelter 3 months ago and then found a room in [place]. Patient then left the room and was planning to go back to the shelter in the [place]. Patient has been staying by his brother's home for the past two days. Patient  has been prior to this not sleeping for two days. Patient was in [place] two months ago and at that time patient was not sleeping. Patient reported at that time of thinking people are trying to kill him. Patient has been saying this for the past week that people are trying to kill him. Patient was last admitted medically at [facility] for heart issues. Patient has been eating at his baseline. Patient is not presenting with SI/HI and has no history of this. Patient is not violent or aggressive. case discussed with psychiatry.

## 2023-06-22 NOTE — ED BEHAVIORAL HEALTH ASSESSMENT NOTE - NSBHMSEAFFQUAL_PSY_A_CORE
initially, angry & irritable. currently as of 3:08PM, unable to assess initially, angry & irritable. currently as of 3:08PM, unable to assess. ON RE-EVALUATION AT 5:55PM:  anxious sad

## 2023-06-22 NOTE — ED BEHAVIORAL HEALTH ASSESSMENT NOTE - NS ED BHA PLAN TR SAFTETY PLAN DISCUSSED2 FT
Pt was adviced to call 911 or come to the nearest ED should symptoms worsen; have increasing bouts of agitation/aggressive behavior; having SI/HI; or call [phone] Pt was adviced to call 911 or come to the nearest ED should symptoms worsen; have increasing bouts of agitation/aggressive behavior; having SI/HI; or call [phone] Pt was adviced to call 911 or come to the nearest ED should symptoms worsen; have increasing bouts of agitation/aggressive behavior; having SI/HI; or call [phone]

## 2023-06-22 NOTE — ED BEHAVIORAL HEALTH ASSESSMENT NOTE - NSBHMSETHTCONTENT_PSY_A_CORE
brother reported that Pt had been paranoid brother reported that Pt had been paranoid.. ON RE-EVALUATION AT 5:55PM: unremarkable

## 2023-06-22 NOTE — ED BEHAVIORAL HEALTH ASSESSMENT NOTE - PSYCHIATRIC ISSUES AND PLAN (INCLUDE STANDING AND PRN MEDICATION)
no standing psych meds for now. PRNs: haldol 5mg PO/IM + ativan 2mg PO/IM q6hrs for psychotic agitation; defer if qTC > 500m/s

## 2023-06-22 NOTE — ED BEHAVIORAL HEALTH ASSESSMENT NOTE - AXIS III
ngina pectoris, Heart failure, Other pulmonary heart diseases, Hypertensive heart disease, Other conduction disorders, Breathing Related Sleep Disorder, DM Type 2 and hx of Fracture of lower leg angina pectoris, Heart failure, Other pulmonary heart diseases, Hypertensive heart disease, Other conduction disorders, Breathing Related Sleep Disorder (claims hx of using CPAP - but currently NOT USING), DM Type 2 and hx of Fracture of lower leg

## 2023-06-22 NOTE — ED BEHAVIORAL HEALTH ASSESSMENT NOTE - DETAILS
per brother, no hx of Suicide attempt(s) initially, was agitated hand off to evening shift Psych ED team mother/ brother initially informed that Pt is currently here at the  ED undergoing observation.  discussed plan for re-evaluation with  ED staff HE IS NOT SUICIDAL

## 2023-06-22 NOTE — ED BEHAVIORAL HEALTH ASSESSMENT NOTE - NSBHMSETHTPROC_PSY_A_CORE
Unable to assess ON RE-EVALUATION AT 5:55PM:  not disorganized. not illogical. he is linear/Unable to assess

## 2023-06-22 NOTE — ED BEHAVIORAL HEALTH ASSESSMENT NOTE - DIFFERENTIAL
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

## 2023-06-22 NOTE — ED BEHAVIORAL HEALTH ASSESSMENT NOTE - DESCRIPTION
initially came in very agitated, uncooperative. had to be medicated as behavior was escalating. after he was medicated, there was no reported recurrence of agitated behavior. no SI or HI reported as per  ED staff.  per   ED medical provider, nothing suggestive that Pt is going into withdrawal.   ED staff, reported that Pt has been sleeping all day.     Vital Signs Last 24 Hrs  T(C): 36.7 (22 Jun 2023 16:02), Max: 36.9 (21 Jun 2023 21:23)  T(F): 98.1 (22 Jun 2023 16:02), Max: 98.4 (21 Jun 2023 21:23)  HR: 77 (22 Jun 2023 16:02) (71 - 95)  BP: 167/94 (22 Jun 2023 16:02) (127/80 - 167/97)  BP(mean): --  RR: 16 (22 Jun 2023 16:02) (16 - 18)  SpO2: 99% (22 Jun 2023 16:02) (99% - 100%)    Parameters below as of 22 Jun 2023 16:02  Patient On (Oxygen Delivery Method): room air see separate BH note for medical issues listed including past meds brother reported that Pt has been living with him for 2 days. initially came in very agitated, uncooperative. had to be medicated as behavior was escalating. after he was medicated, there was no reported recurrence of agitated behavior. no SI or HI reported as per  ED staff.  per   ED medical provider, nothing suggestive that Pt is going into withdrawal.   ED staff, reported that Pt has been sleeping all day.     Vital Signs Last 24 Hrs  T(C): 36.7 (22 Jun 2023 16:02), Max: 36.9 (21 Jun 2023 21:23)  T(F): 98.1 (22 Jun 2023 16:02), Max: 98.4 (21 Jun 2023 21:23)  HR: 77 (22 Jun 2023 16:02) (71 - 95)  BP: 167/94 (22 Jun 2023 16:02) (127/80 - 167/97)  BP(mean): --  RR: 16 (22 Jun 2023 16:02) (16 - 18)  SpO2: 99% (22 Jun 2023 16:02) (99% - 100%)    Parameters below as of 22 Jun 2023 16:02  Patient On (Oxygen Delivery Method): room air    AT 5:45PM, the PATIENT REMAINS ASLEEP initially came in very agitated, uncooperative. had to be medicated as behavior was escalating. after he was medicated, there was no reported recurrence of agitated behavior. no SI or HI reported as per  ED staff.  per   ED medical provider, nothing suggestive that Pt is going into withdrawal.   ED staff, reported that Pt has been sleeping all day.     Vital Signs Last 24 Hrs  T(C): 36.7 (22 Jun 2023 16:02), Max: 36.9 (21 Jun 2023 21:23)  T(F): 98.1 (22 Jun 2023 16:02), Max: 98.4 (21 Jun 2023 21:23)  HR: 77 (22 Jun 2023 16:02) (71 - 95)  BP: 167/94 (22 Jun 2023 16:02) (127/80 - 167/97)  BP(mean): --  RR: 16 (22 Jun 2023 16:02) (16 - 18)  SpO2: 99% (22 Jun 2023 16:02) (99% - 100%)    Parameters below as of 22 Jun 2023 16:02  Patient On (Oxygen Delivery Method): room air    AT 5:45PM, the PATIENT REMAINS ASLEEP    AT 5:55PM, he was re-evaluated bedside. HE IS AWAKE, COOPERATIVE, ENGAGEABLE, NOT INTERNALLY PREOCCUPIED.  CLAIMS THAT YESTERDAY, WANTED TO GO INTO REHAB. HOWEVER, WHEN HE SAW THE PLACE WHERE THE CLINIC WAS SITUATED, HE CLAIMED BEING "SPOOKED OUT". DESCRIBED PLACE AS HAVING MULTIPLE OLD BUILDINGS, SOME OF THE BUILDINGS WERE "ABANDONED". HE DECIDED NOT TO PROCEED ATTENDING THE REHAB PROGRAM (teresa GU).  REPORTED THAT HE WAS JUST CROSSING THE STREET. ADMITTED THAT PRIOR TO GOING TO THIS REHAB CENTER, DRANK A BOTTLE OF DARLENE & DID COCAINE.      DESCRIBED HIS MOOD AS "ON AND OFF" SAD & ANXIOUS. SADNESS AND ANXIETY APPARENTLY TRIGGERED BY ONGOING LIFE STRESSORS WHICH HE DECLINED TO ELABORATE. DENIED FEELING HOPELESS/ HELPLESS/ WORTHLESS. NO PASSIVE OR ACTIVE SUICIDAL OR HOMICIDAL THOUGHTS. CURRENTLY NOT HARBORING ANY PASSIVE OR ACTIVE SI or HI. DENIED EXPERIENCING ANY SPECIFIC ANXIETY DISORDER SYMPTOMS. NO SYMPTOMS OF RONALD EXPERIENCED. DENIED ANY PERCEPTUAL DISTURBANCES. ADMITTED THOUGH THAT HE FEELS UNSAFE IN FAR [place] - AS HIS ROOM MATE "REQUESTED FOR HIM TO LEAVE".  pT FEELS A HEIGHTENED SENSE OF FEAR/ ANXIETY WHENEVER HE IS IN THE STREETS - AS HE FEELS THAT PEOPLE MAY ATTACK OR SHOOT HIM. HE EXPERIENCES THESE SENSATIONS WHENEVER HE DOES COCAINE, ECTASY. REPORTED THAT HE DID NOT USE TO TAKE COCAINE (ONLY BEGAN USING IT SINCE 2020). HOWEVER, SINCE 2021 UP TO THE PRESENT, COCAINE USE HAS BEEN ESCALATING.  PATIENT DENIED ANY THOUGHT BROADCASTING/ WITHDRAWAL/ INSERTION. HE EXPRESSED INTEREST TOWARDS PARTICIPATING IN A REHAB PROGRAM. initially came in very agitated, uncooperative. had to be medicated as behavior was escalating. after he was medicated, there was no reported recurrence of agitated behavior. no SI or HI reported as per  ED staff.  per   ED medical provider, nothing suggestive that Pt is going into withdrawal.   ED staff, reported that Pt has been sleeping all day.     Vital Signs Last 24 Hrs  T(C): 36.7 (22 Jun 2023 16:02), Max: 36.9 (21 Jun 2023 21:23)  T(F): 98.1 (22 Jun 2023 16:02), Max: 98.4 (21 Jun 2023 21:23)  HR: 77 (22 Jun 2023 16:02) (71 - 95)  BP: 167/94 (22 Jun 2023 16:02) (127/80 - 167/97)  BP(mean): --  RR: 16 (22 Jun 2023 16:02) (16 - 18)  SpO2: 99% (22 Jun 2023 16:02) (99% - 100%)    Parameters below as of 22 Jun 2023 16:02  Patient On (Oxygen Delivery Method): room air    AT 5:45PM, the PATIENT REMAINS ASLEEP    AT 5:55PM, he was re-evaluated bedside. HE IS AWAKE, COOPERATIVE, ENGAGEABLE, NOT INTERNALLY PREOCCUPIED.  CLAIMS THAT YESTERDAY, WANTED TO GO INTO REHAB. HOWEVER, WHEN HE SAW THE PLACE WHERE THE CLINIC WAS SITUATED, HE CLAIMED BEING "SPOOKED OUT". DESCRIBED PLACE AS HAVING MULTIPLE OLD BUILDINGS, SOME OF THE BUILDINGS WERE "ABANDONED". HE DECIDED NOT TO PROCEED ATTENDING THE REHAB PROGRAM (teresa GU).  REPORTED THAT HE WAS JUST CROSSING THE STREET. ADMITTED THAT PRIOR TO GOING TO THIS REHAB CENTER, DRANK A BOTTLE OF DARLENE & DID COCAINE.      DESCRIBED HIS MOOD AS "ON AND OFF" SAD & ANXIOUS. SADNESS AND ANXIETY APPARENTLY TRIGGERED BY ONGOING LIFE STRESSORS WHICH HE DECLINED TO ELABORATE. DENIED FEELING HOPELESS/ HELPLESS/ WORTHLESS. NO PASSIVE OR ACTIVE SUICIDAL OR HOMICIDAL THOUGHTS. CURRENTLY NOT HARBORING ANY PASSIVE OR ACTIVE SI or HI. DENIED EXPERIENCING ANY SPECIFIC ANXIETY DISORDER SYMPTOMS. NO SYMPTOMS OF RONALD EXPERIENCED. DENIED ANY PERCEPTUAL DISTURBANCES. ADMITTED THOUGH THAT HE FEELS UNSAFE IN FAR [place] - AS HIS ROOM MATE "REQUESTED FOR HIM TO LEAVE".  pT FEELS A HEIGHTENED SENSE OF FEAR/ ANXIETY WHENEVER HE IS IN THE STREETS - AS HE FEELS THAT PEOPLE MAY ATTACK OR SHOOT HIM. HE EXPERIENCES THESE SENSATIONS WHENEVER HE DOES COCAINE, ECTASY. REPORTED THAT HE DID NOT USE TO TAKE COCAINE (ONLY BEGAN USING IT SINCE 2020). HOWEVER, SINCE 2021 UP TO THE PRESENT, COCAINE USE HAS BEEN ESCALATING.  PATIENT DENIED ANY THOUGHT BROADCASTING/ WITHDRAWAL/ INSERTION. HE EXPRESSED INTEREST TOWARDS PARTICIPATING IN A REHAB PROGRAM. initially came in very agitated, uncooperative. had to be medicated as behavior was escalating. after he was medicated, there was no reported recurrence of agitated behavior. no SI or HI reported as per  ED staff.  per   ED medical provider, nothing suggestive that Pt is going into withdrawal.   ED staff, reported that Pt has been sleeping all day.     Vital Signs Last 24 Hrs  T(C): 36.7 (22 Jun 2023 16:02), Max: 36.9 (21 Jun 2023 21:23)  T(F): 98.1 (22 Jun 2023 16:02), Max: 98.4 (21 Jun 2023 21:23)  HR: 77 (22 Jun 2023 16:02) (71 - 95)  BP: 167/94 (22 Jun 2023 16:02) (127/80 - 167/97)  BP(mean): --  RR: 16 (22 Jun 2023 16:02) (16 - 18)  SpO2: 99% (22 Jun 2023 16:02) (99% - 100%)    Parameters below as of 22 Jun 2023 16:02  Patient On (Oxygen Delivery Method): room air    AT 5:45PM, the PATIENT REMAINS ASLEEP    AT 5:55PM, he was re-evaluated bedside. HE IS AWAKE, COOPERATIVE, ENGAGEABLE, NOT INTERNALLY PREOCCUPIED.  CLAIMS THAT YESTERDAY, WANTED TO GO INTO REHAB. HOWEVER, WHEN HE SAW THE PLACE WHERE THE CLINIC WAS SITUATED, HE CLAIMED BEING "SPOOKED OUT". DESCRIBED PLACE AS HAVING MULTIPLE OLD BUILDINGS, SOME OF THE BUILDINGS WERE "ABANDONED". HE DECIDED NOT TO PROCEED ATTENDING THE REHAB PROGRAM (teresa GU).  REPORTED THAT HE WAS JUST CROSSING THE STREET. ADMITTED THAT PRIOR TO GOING TO THIS REHAB CENTER, DRANK A BOTTLE OF DARLENE & DID COCAINE.      DESCRIBED HIS MOOD AS "ON AND OFF" SAD & ANXIOUS. SADNESS AND ANXIETY APPARENTLY TRIGGERED BY ONGOING LIFE STRESSORS WHICH HE DECLINED TO ELABORATE. DENIED FEELING HOPELESS/ HELPLESS/ WORTHLESS. NO PASSIVE OR ACTIVE SUICIDAL OR HOMICIDAL THOUGHTS. CURRENTLY NOT HARBORING ANY PASSIVE OR ACTIVE SI or HI. DENIED EXPERIENCING ANY SPECIFIC ANXIETY DISORDER SYMPTOMS. NO SYMPTOMS OF RONALD EXPERIENCED. DENIED ANY PERCEPTUAL DISTURBANCES. ADMITTED THOUGH THAT HE FEELS UNSAFE IN FAR [place] - AS HIS ROOM MATE "REQUESTED FOR HIM TO LEAVE".  pT FEELS A HEIGHTENED SENSE OF FEAR/ ANXIETY WHENEVER HE IS IN THE STREETS - AS HE FEELS THAT PEOPLE MAY ATTACK OR SHOOT HIM. HE EXPERIENCES THESE SENSATIONS WHENEVER HE DOES COCAINE, ECTASY. REPORTED THAT HE DID NOT USE TO TAKE COCAINE (ONLY BEGAN USING IT SINCE 2020). HOWEVER, SINCE 2021 UP TO THE PRESENT, COCAINE USE HAS BEEN ESCALATING.  PATIENT DENIED ANY THOUGHT BROADCASTING/ WITHDRAWAL/ INSERTION. HE EXPRESSED INTEREST TOWARDS PARTICIPATING IN A REHAB PROGRAM. brother reported that Pt has been living with him for 2 days. ON RE-EVALUATION AT 5:55PM:  claims he is not . has 3 children (12 yr old daughter, 4 yr old daughter and 2 week old son). he is Sikh. no reported access to guns brother reported that Pt has been living with him for 2 days. ON RE-EVALUATION AT 5:55PM:  claims he is not . has 3 children (12 yr old daughter, 4 yr old daughter and 2 week old son). he is Confucianism. no reported access to guns brother reported that Pt has been living with him for 2 days. ON RE-EVALUATION AT 5:55PM:  claims he is not . has 3 children (12 yr old daughter, 4 yr old daughter and 2 week old son). he is Baptism. no reported access to guns

## 2023-06-22 NOTE — ED BEHAVIORAL HEALTH NOTE - BEHAVIORAL HEALTH NOTE
Writer called [facility] and spoke to erin who states they have no beds for rehab at this time, but the patient can call on his own tomorrow to see if they have any availability and do screening on phone. Worker then called telephonic sbirt [phone] and there was no answer.   Worker met with patient who states that he is willing to get help with rehab. Worker discussed other options for rehab/ detox ([facility], [facility], and a list of outpatients [facility] rehabs [facility]). Patient took referrals. Worker also provided sbirt telephonic phone [phone] for patient for linkage. Patient provided his number as [phone]. Worker called on call [phone] telephonic sbirt and left the referral for follow up on sbirt phone. Worker met with patient’s mother in waiting room who was informed of patients discharged. She states that the patient only presents like this when he uses drugs. She states that he went to [facility] for rehab and then left. Worker informed that she provided referral for patient’s follow up. Patient will be discharge to mother. Writer called [facility] and spoke to erin who states they have no beds for rehab at this time, but the patient can call on his own tomorrow to see if they have any availability and do screening on phone. Worker then called telephonic sbirt [phone] and there was no answer.   Worker met with patient who states that he is willing to get help with rehab. Worker discussed other options for rehab/ detox ([facility], [facility], and a list of outpatients [facility] rehabs [facility]). Patient took referrals. Worker also provided sbirt telephonic phone [phone] for patient for linkage. Patient provided his number as [phone]. Worker called on call [phone] telephonic sbirt and left the referral for follow up on sbirt phone. Worker met with patient’s mother in waiting room who was informed of patients discharged. She states that the patient only presents like this when he uses drugs. She states that he went to [facility] for rehab and then left. Worker informed that she provided referral for patient’s follow up. Patient will be discharge to mother. Writer called [facility] and spoke to erin who states they have no beds for rehab at this time, but the patient can call on his own tomorrow to see if they have any availability and do screening on phone. Worker then called telephonic sbirt [phone] and there was no answer.   Worker met with patient who states that he is willing to get help with rehab. Worker discussed other options for rehab/ detox ([facility], and a list of outpatients [facility] rehabs [facility]). Patient took referrals. Worker also provided sbirt telephonic phone [phone] for patient for linkage. Patient provided his number as [phone]. Worker called on call [phone] telephonic sbirt and left the referral for follow up on sbirt phone. Worker met with patient’s mother in waiting room who was informed of patients discharged. She states that the patient only presents like this when he uses drugs. She states that he went to [facility] for rehab and then left. Worker informed that she provided referral for patient’s follow up. Patient will be discharge to mother. Writer called [facility] and spoke to erin who states they have no beds for rehab at this time, but the patient can call on his own tomorrow to see if they have any availability and do screening on phone. Worker then called telephonic sbirt [phone] and there was no answer.   Worker met with patient who states that he is willing to get help with rehab. Worker discussed other options for rehab/ detox ([facility], and a list of outpatients [facility] re[facility]abs [facility]). Patient took referrals. Worker also provided sbirt telephonic phone [phone] for patient for linkage. Patient provided his number as [phone]. Worker called on call [phone] telephonic sbirt and left the referral for follow up on sbirt phone. Worker met with patient’s mother in waiting room who was informed of patients discharged. She states that the patient only presents like this when he uses drugs. She states that he went to [facility] for rehab and then left. Worker informed that she provided referral for patient’s follow up. Patient will be discharge to mother.    printed psykees and provided this to provider. Writer called [facility] and spoke to erin who states they have no beds for rehab at this time, but the patient can call on his own tomorrow to see if they have any availability and do screening on phone. Worker then called telephonic sbirt [phone] and there was no answer.   Worker met with patient who states that he is willing to get help with rehab. Worker discussed other options for rehab/ detox ([facility], and a list [facility] outpatients [facility] rehabs [facility]). Patient took referrals. Worker also provided sbirt telephonic phone [phone] for patient for linkage. Patient provided his number as [phone]. Worker called on call [phone] telephonic sbirt and left the referral for follow up on sbirt phone. Worker met with patient’s mother in waiting room who was informed of patients discharged. She states that the patient only presents like this when he uses drugs. She states that he went to [facility] for rehab and then left. Worker informed that she provided referral for patient’s follow up. Patient will be discharge to mother.    printed psykees and provided this to provider. Writer called [facility] and spoke to erin who states they have no beds for rehab at this time, but the patient can call on his own tomorrow to see if they have any availability and do screening on phone. Worker then called telephonic sbirt [phone] and there was no answer.   Worker met with patient who states that he is willing to get help with rehab. Worker discussed other options for rehab/ detox ([facility], and a list of outpatients [facility] rehabs [facility]). Patient took referrals. Worker also provided sbirt telephonic phone [phone] for patient for linkage. Patient provided his number as [phone]. Worker called on call [phone] telephonic sbirt and left the referral for follow up on sbirt phone. Worker met with patient’s mother in waiting room who was informed of patients discharged. She states that the patient only presents like this when he uses drugs. She states that he went to [facility] for rehab and then left. Worker informed that she provided referral for patient’s follow up. Patient will be discharge to mother.    printed psykees and provided this to provider. Writer called [facility] and spoke to erin who states they have no beds for rehab at this time, but the patient can call on his own tomorrow to see if they have any availability and do screening on phone. Worker then called telephonic sbirt [phone] and there was no answer.   Worker met with patient who states that he is willing to get help with rehab. Worker discussed other options for rehab/ detox ([facility], and a list of outpatients [facility] rehabs [facility]). Patient took referrals. Worker also provided sbirt telephonic phone [phone] for patient for linkage. Patient provided his number as [phone]. Worker called on call [phone] telephonic sbirt and left the referral for follow up on sbirt phone. Worker met with patient’s mother in waiting room who was informed of patients discharged. She states that the patient only presents like this when he uses drugs. She states that he went to [facility] for rehab and then left. Worker informed that she provided referral for patient’s follow up. Patient will be discharge to mother.    printed psykees and provided this to provider.    Worker facilitated call with sbirt team member Suyapa. Patient spoke with sbirt about linkage on phone. Writer called [facility] and spoke to erin who states they have no beds for rehab at this time, but the patient can call on his own tomorrow to see if they have any availability and do screening on phone. Worker then called telephonic sbirt [phone] and there was no answer.   Worker met with patient who states that he is willing to get help with rehab. Worker discussed other options for rehab/ detox ([facility], and a list of outpatients [facility] rehabs [facility]). Patient took referrals. Worker also provided sbirt telephonic phone [phone] for patient for linkage. Patient provided his number as [phone]. Worker called on call [phone] telephonic sbirt and left the referral for follow up on sbirt phone. Worker met with patient’s mother in waiting room who was informed of patients discharged. She states that the patient only presents like this when he uses drugs. She states that he went to [facility] for rehab and then left. Worker informed that she provided referral for patient’s follow up. Patient will be discharge to mother.    printed psykees and provided this to provider.    Worker facilitated call with sbirt team member Syuapa. Patient spoke with sbirt about linkage on phone. Writer called [facility] and spoke to erin who states they have no beds for rehab at this time, but the patient can call on his own tomorrow to see if they have any availability and do screening on phone. Worker then called telephonic sbirt [phone] and there was no answer.   Worker met with patient who states that he is willing to get help with rehab. Worker discussed other options for rehab/ detox ([facility], and a list of outpatients [facility] rehabs [facility]). Patient took referrals. Worker also provided sbirt telephonic phone [phone] for patient for linkage. Patient provided his number as [phone]. Worker called on call [phone] telephonic sbirt and left the referral for follow up on sbirt phone. Worker met with patient’s mother in waiting room who was informed of patients discharged. She states that the patient only presents like this when he uses drugs. She states that he went to [facility] for rehab and then left. Worker informed that she provided referral for patient’s follow up. Patient will be discharge to mother.    printed psykees and provided this to provider.    Worker facilitated call with sbirt team member Suyapa. Patient spoke with sbirt about linkage on phone.

## 2023-06-22 NOTE — ED BEHAVIORAL HEALTH ASSESSMENT NOTE - RISK ASSESSMENT
at 3:08PM, unable to determine acuteness of suicide risk as well as chronicity of suicide risk as Pt is not engageable; unable to provide historical account at 3:08PM, unable to determine acuteness of suicide risk as well as chronicity of suicide risk as Pt is not engageable; unable to provide historical account  RISK FACTORS (5:55PM):  Modifiable risk factors: depression, anxiety, drugs/ alcohol  Unmodifiable risk factors: past hx of aggressive behavior in the context of acute intoxication, hx of MDD, mid age male, not receiving treatment, co-morbid substance use  Protective factors: denies (and currently no objective evidence of active) suicidality, no hx of SA or any self injurious behaviors,  no family hx of SA, future-oriented/ help seeking, good support from family, endorses responsibility to family, no access to lethal means like guns,  is not severely depressed/ severely anxious/ not acutely manic or floridly psychotic, no pending legal cases, not in withdrawal, Jain    Given above, the Pt is currently at low acute suicide risk and is not at chronically elevated risk of self-harm.  At this time,  there are no identifiable acute increase in risk(s) that would be mitigated by psychiatric admission. The Pt remains appropriate for out Pt level of care. Modifiable risk factors will be addressed once out-Pt treatment has been established. at 3:08PM, unable to determine acuteness of suicide risk as well as chronicity of suicide risk as Pt is not engageable; unable to provide historical account  RISK FACTORS (5:55PM):  Modifiable risk factors: depression, anxiety, drugs/ alcohol  Unmodifiable risk factors: past hx of aggressive behavior in the context of acute intoxication, hx of MDD, mid age male, not receiving treatment, co-morbid substance use  Protective factors: denies (and currently no objective evidence of active) suicidality, no hx of SA or any self injurious behaviors,  no family hx of SA, future-oriented/ help seeking, good support from family, endorses responsibility to family, no access to lethal means like guns,  is not severely depressed/ severely anxious/ not acutely manic or floridly psychotic, no pending legal cases, not in withdrawal, Mos[employer]    Given above, the Pt is currently at low acute suicide risk and is not at chronically elevated risk of self-harm.  At this time,  there are no identifiable acute increase in risk(s) that would be mitigated by psychiatric admission. The Pt remains appropriate for out Pt level of care. Modifiable risk factors will be addressed once out-Pt treatment has been established. at 3:08PM, unable to determine acuteness of suicide risk as well as chronicity of suicide risk as Pt is not engageable; unable to provide historical account  RISK FACTORS (5:55PM):  Modifiable risk factors: depression, anxiety, drugs/ alcohol  Unmodifiable risk factors: past hx of aggressive behavior in the context of acute intoxication, hx of MDD, mid age male, not receiving treatment, co-morbid substance use  Protective factors: denies (and currently no objective evidence of active) suicidality, no hx of SA or any self injurious behaviors,  no family hx of SA, future-oriented/ help seeking, good support from family, endorses responsibility to family, no access to lethal means like guns,  is not severely depressed/ severely anxious/ not acutely manic or floridly psychotic, no pending legal cases, not in withdrawal, Latter-day    Given above, the Pt is currently at low acute suicide risk and is not at chronically elevated risk of self-harm.  At this time,  there are no identifiable acute increase in risk(s) that would be mitigated by psychiatric admission. The Pt remains appropriate for out Pt level of care. Modifiable risk factors will be addressed once out-Pt treatment has been established.

## 2023-06-22 NOTE — ED BEHAVIORAL HEALTH ASSESSMENT NOTE - LEVEL OF CONSCIOUSNESS
asleep. was arousable earlier but refused to engage asleep. was arousable earlier but refused to engage. ON RE-EVALUATION AT 5:55PM: alert

## 2023-06-22 NOTE — ED BEHAVIORAL HEALTH ASSESSMENT NOTE - NSBHMSEINSIGHT_PSY_A_CORE
initially, poor. currently as of 3:08PM, unable to assess initially, poor. currently as of 3:08PM, unable to assess. ON RE-EVALUATION AT 5:55PM:  fair

## 2023-06-22 NOTE — ED BEHAVIORAL HEALTH ASSESSMENT NOTE - OTHER
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

## 2023-06-22 NOTE — ED BEHAVIORAL HEALTH ASSESSMENT NOTE - SUMMARY
42/M: as per Psyckes: with documented hx of MDD; no hx of psych admissions for the last 5 yrs; no hx of SA as per brother and hx of polysubstance use. pertinent medical issues include: Angina pectoris, Heart failure, Other pulmonary heart diseases, Hypertensive heart disease, Other conduction disorders, Breathing Related Sleep Disorder, DM Type 2 and hx of Fracture of lower leg, including ankle. brother denied hx of violence. no pending legal issues. presented to the ED last night BIB EMS due to agitation and bizarre behavior.     on initial encounter, was noted to be severely affectively dysregulated and unpredictable.. brother reported that Pt has recently been paranoid, not sleeping well. mother and brother reported that Pt has been drinking alcohol. furthermore, brother claimed Pt has been using cocaine + ecstasy.  Pt presented to the ED extremely agitated/ uncooperative/ belligerent. least restrictive measure via multiple verbal redirection made - however, Pt was not responding.  he was deemed a threat to self and to others on initial presentation. hence, had to be medicated with Haldol/ ativan/ benadryl IM - noted with robust effects.     at the time of initial psych evaluation (conducted at 3:08PM), writer is unable to fully engage Pt towards a meaningful psych evaluation to determine potential etiology of ongoing aggressive behavior - as Pt is asleep; not cooperating.  differentials initially suspected however, include: psychosis - primary vs substance induced vs affective disorder with psychotic component.  as such, unable to determine dispo for this Pt.     RECOMMENDATIONS:  1. RE-EVALUATE ONCE SENSORIUM IMPROVES   2. pertinent labs reviewed: K 3.4, AG 18, TSH 0.22, , covid negative  no tox screen as pt has not submitted any urine sample  3. no standing psychotropics for now 42/M: as per Psyckes: with documented hx of MDD; no hx of psych admissions for the last 5 yrs; no hx of SA as per brother and hx of polysubstance use. pertinent medical issues include: Angina pectoris, Heart failure, Other pulmonary heart diseases, Hypertensive heart disease, Other conduction disorders, Breathing Related Sleep Disorder, DM Type 2 and hx of Fracture of lower leg, including ankle. brother denied hx of violence. no pending legal issues. presented to the ED last night BIB EMS due to agitation and bizarre behavior.     on initial encounter, was noted to be severely affectively dysregulated and unpredictable.. brother reported that Pt has recently been paranoid, not sleeping well. mother and brother reported that Pt has been drinking alcohol. furthermore, brother claimed Pt has been using cocaine + ecstasy.  Pt presented to the ED extremely agitated/ uncooperative/ belligerent. least restrictive measure via multiple verbal redirection made - however, Pt was not responding.  he was deemed a threat to self and to others on initial presentation. hence, had to be medicated with Haldol/ ativan/ benadryl IM - noted with robust effects.     at the time of initial psych evaluation (conducted at 3:08PM), writer is unable to fully engage Pt towards a meaningful psych evaluation to determine potential etiology of ongoing aggressive behavior - as Pt is asleep; not cooperating.  differentials initially suspected however, include: psychosis - primary vs substance induced vs affective disorder with psychotic component.  as such, unable to determine dispo for this Pt.     RECOMMENDATIONS:  1. RE-EVALUATE ONCE SENSORIUM IMPROVES   2. pertinent labs reviewed: K 3.4, AG 18, TSH 0.22, , covid negative  no tox screen as pt has not submitted any urine sample  3. no standing psychotropics for now    ON RE-EVALUATION AT 555PM: is NOT EXHIBITING ANY SIGNS/ SYMPTOMS OF PRIMARY PSYCHOSIS. RATHER ENDORSED PARANOIA IN THE CONTEXT OF ILLICIT DRUG USE.  WHILST DOES ENDORSE FEELING SAD AND ANXIOUS, SAID DEPRESSIVE AND ANXIETY SYMPTOMS EXPERIENCED AT THIS TIME DO NOT MEET SEVERE MDD NOR ANY SPECIFIC ANXIETY DISORDER CRITERIA.  SADNESS & ANXIETY ARE PRECIPITATED AND PERPETUATED BY ONGOING PSYCHOSOCIAL STRESSORS.  PATIENT IS NOT MANIFESTING ANY SIGNS/ SYMPTOMS OF PSYCHOTIC NOR RONALD.      AT THIS TIME, HE IS NOT SUICIDAL NOR HOMICIDAL. PT IS NOT MANIFESTING ANY SIGNS/ SYMPTOMS OF IMPENDING WITHDRAWAL. HE IS NOT DELIRIOUS. CURRENTLY, THERE IS NO JUSTIFICATION TO PURSUE PSYCH ADMISSION FOR THIS PATIENT. HOWEVER, HE WILL GREATLY BENEFIT FROM PARTICIPATING IN AN COMPREHENSIVE AND MEANINGFUL SUBSTANCE ABUSE DETOX/ REHAB PROGRAM.  HE EXPRESSED INTEREST TOWARDS PARTAKING IN SUCH PROGRAM.   MOTHER WAS INFORMED OF FINAL DISPI. SHE CONCURS THAT ETIOLOGY OF PATIENT'S PRESENTATION IS ATTRIBUTABLE TO DRUG ABUSE.  NO OPPOSITION RAISED TOWARDS PATIENT BEING DISCHARGED BACK TO THE COMMUNITY.    RECOMMENDATIONS:   1. Psychoeducation provided.  Encouraged follow up with OP psych services.  No indication for emergent psych meds at this time. Discussed role of psychotherapy.  discussed impact of drugs/ alcohol on health and well being as well as the importance of sobriety.   2. Emergency protocol reviewed.  Pt and mother were adviced to call 911 or come to the nearest ED should symptoms worsen; have increasing bouts of agitation/aggressive behavior; having SI/HI; or call [phone]    3. given resources to the community: substance use programs within Pt's community  4. no psych meds prescribed 42/M: as per Psyckes: with documented hx of MDD; no hx of psych admissions for the last 5 yrs; no hx of SA as per brother and hx of polysubstance use. pertinent medical issues include: Angina pectoris, Heart failure, Other pulmonary heart diseases, Hypertensive heart disease, Other conduction disorders, Breathing Related Sleep Disorder, DM Type 2 and hx of Fracture of lower leg, including ankle. brother denied hx of violence. no pending legal issues. presented to the ED last night BIB EMS due to agitation and bizarre behavior.     on initial encounter, was noted to be severely affectively dysregulated and unpredictable.. brother reported that Pt has recently been paranoid, not sleeping well. mother and brother reported that Pt has been drinking alcohol. furthermore, brother claimed Pt has been using cocaine + ecstasy.  Pt presented to the ED extremely agitated/ uncooperative/ belligerent. least restrictive measure via multiple verbal redirection made - however, Pt was not responding.  he was deemed a threat to self and to others on initial presentation. hence, had to be medicated with Haldol/ ativan/ benadryl IM - noted with robust effects.     at the time of initial psych evaluation (conducted at 3:08PM), writer is unable to fully engage Pt towards a meaningful psych evaluation to determine potential etiology of ongoing aggressive behavior - as Pt is asleep; not cooperating.  differentials initially suspected however, include: psychosis - primary vs substance induced vs affective disorder with psychotic component.  as such, unable to determine dispo for this Pt.     RECOMMENDATIONS:  1. RE-EVALUATE ONCE SENSORIUM IMPROVES   2. pertinent labs reviewed: K 3.4, AG 18, TSH 0.22, , covid negative  no tox screen as pt has not submitted any urine sample  3. no standing psychotropics for now    ON RE-EVALUATION AT 555PM: is NOT EXHIBITING ANY SIGNS/ SYMPTOMS OF PRIMARY PSYCHOSIS. RATHER ENDORSED PARANOIA IN THE CONTEXT OF ILLICIT DRUG USE.  WHILST DOES ENDORSE FEELING SAD AND ANXIOUS, SAID DEPRESSIVE AND ANXIETY SYMPTOMS EXPERIENCED AT THIS TIME DO NOT MEET SEVERE MDD NOR ANY SPECIFIC ANXIETY DISORDER CRITERIA.  SADNESS & ANXIETY ARE PRECIPITATED AND PERPETUATED BY ONGOING PSYCHOSOCIAL STRESSORS.  PATIENT IS NOT MANIFESTING ANY SIGNS/ SYMPTOMS OF PSYCHOTIC NOR RONALD.      AT THIS TIME, HE IS NOT SUICIDAL NOR HOMICIDAL. PT IS NOT MANIFESTING ANY SIGNS/ SYMPTOMS OF IMPENDING WITHDRAWAL. HE IS NOT DELIRIOUS. CURRENTLY, THERE IS NO JUSTIFICATION TO PURSUE PSYCH ADMISSION FOR THIS PATIENT. HOWEVER, HE WILL GREATLY BENEFIT FROM PARTICIPATING IN AN COMPREHENSIVE AND MEANINGFUL SUBSTANCE ABUSE DETOX/ REHAB PROGRAM.  HE EXPRESSED INTEREST TOWARDS PARTAKING IN SUCH PROGRAM.   MOTHER WAS INFORMED OF FINAL DISPI. SHE CONCURS THAT ETIOLOGY OF PATIENT'S PRESENTATION IS ATTRIBUTABLE TO DRUG ABUSE.  NO OPPOSITION RAISED TOWARDS PATIENT BEING DISCHARGED BACK TO THE COMMUNITY.    RECOMMENDATIONS:   1. Psychoeducation provided.  Encouraged follow up with OP psych services.  No indication for emergent psych meds at this time. Discussed role of psychotherapy.  discussed impact of drugs/ alcohol on health and well being as well as the importance of sobriety.   2. Emergency protocol reviewed.  Pt and mother were adviced to call 911 or come to the nearest ED should symptoms worsen; have increasing bouts of agitation/aggressive behavior; having SI/HI; or call [phone]    3. given resources to the community: substance use programs within Pt's community  4. no psych meds prescribed 42/M: as per Psyckes: with documented hx of MDD; no hx of psych admissions for the last 5 yrs; no hx of SA as per brother and hx of polysubstance use. pertinent medical issues include: Angina pectoris, Heart failure, Other pulmonary heart diseases, Hypertensive heart disease, Other conduction disorders, Breathing Related Sleep Disorder, DM Type 2 and hx of Fracture of lower leg, including ankle. brother denied hx of violence. no pending legal issues. presented to the ED last night BIB EMS due to agitation and bizarre behavior.     on initial encounter, was noted to be severely affectively dysregulated and unpredictable.. brother reported that Pt has recently been paranoid, not sleeping well. mother and brother reported that Pt has been drinking alcohol. furthermore, brother claimed Pt has been using cocaine + ecstasy.  Pt presented to the ED extremely agitated/ uncooperative/ belligerent. least restrictive measure via multiple verbal redirection made - however, Pt was not responding.  he was deemed a threat to self and to others on initial presentation. hence, had to be medicated with Haldol/ ativan/ benadryl IM - noted with robust effects.     at the time of initial psych evaluation (conducted at 3:08PM), writer is unable to fully engage Pt towards a meaningful psych evaluation to determine potential etiology of ongoing aggressive behavior - as Pt is asleep; not cooperating.  differentials initially suspected however, include: psychosis - primary vs substance induced vs affective disorder with psychotic component.  as such, unable to determine dispo for this Pt.     RECOMMENDATIONS:  1. RE-EVALUATE ONCE SENSORIUM IMPROVES   2. pertinent labs reviewed: K 3.4, AG 18, TSH 0.22, , covid negative  no tox screen as pt has not submitted any urine sample  3. no standing psychotropics for now    ON RE-EVALUATION AT 555PM: is NOT EXHIBITING ANY SIGNS/ SYMPTOMS OF PRIMARY PSYCHOSIS. RATHER ENDORSED PARANOIA IN THE CONTEXT OF ILLICIT DRUG USE.  WHILST DOES ENDORSE FEELING SAD AND ANXIOUS, SAID DEPRESSIVE AND ANXIETY SYMPTOMS EXPERIENCED AT THIS TIME DO NOT MEET SEVERE MDD NOR ANY SPECIFIC ANXIETY DISORDER CRITERIA.  SADNESS & ANXIETY ARE PRECIPITATED AND PERPETUATED BY ONGOING PSYCHOSOCIAL STRESSORS.  PATIENT IS NOT MANIFESTING ANY SIGNS/ SYMPTOMS OF PSYCHOTIC NOR RONALD.      AT THIS TIME, HE IS NOT SUICIDAL NOR HOMICIDAL. PT IS NOT MANIFESTING ANY SIGNS/ SYMPTOMS OF IMPENDING WITHDRAWAL. HE IS NOT DELIRIOUS. CURRENTLY, THERE IS NO JUSTIFICATION TO PURSUE PSYCH ADMISSION FOR THIS PATIENT. HOWEVER, HE WILL GREATLY BENEFIT FROM PARTICIPATING IN AN COMPREHENSIVE AND MEANINGFUL SUBSTANCE ABUSE DETOX/ REHAB PROGRAM.  HE EXPRESSED INTEREST TOWARDS PARTAKING IN SUCH PROGRAM.   MOTHER WAS INFORMED OF FINAL DISPI. SHE CONCURS THAT ETIOLOGY OF PATIENT'S PRESENTATION IS ATTRIBUTABLE TO DRUG ABUSE.  NO OPPOSITION RAISED TOWARDS PATIENT BEING DISCHARGED BACK TO THE COMMUNITY.    RECOMMENDATIONS:   1. Psychoeducation provided.  Encouraged follow up with OP psych services.  No indication for emergent psych meds at this time. Discussed role of psychotherapy.  discussed impact of drugs/ alcohol on health and well being as well as the importance of sobriety.   2. Emergency protocol reviewed.  Pt and mother were adviced to call 911 or come to the nearest ED should symptoms worsen; have increasing bouts of agitation/aggressive behavior; having SI/HI; or call [phone]    3. given resources to the community: substance use programs within Pt's community  4. no psych meds prescribed

## 2023-06-22 NOTE — ED BEHAVIORAL HEALTH ASSESSMENT NOTE - NSBHMSEMOOD_PSY_A_CORE
initially, was angry and irritable. currently as of 3:08PM, unable to assess initially, was angry and irritable. currently as of 3:08PM, unable to assess. ON RE-EVALUATION AT 5:55PM:  sad, anxious

## 2023-06-22 NOTE — ED ADULT NURSE REASSESSMENT NOTE - NS ED NURSE REASSESS COMMENT FT1
Pt sleeping in BH room. Respirations even and unlabored, no accessory muscle use. NAD noted at this time. Pt pending re-assessment when awake in AM.
Pt sleeping in bed in . Respirations even and unlabored, no accessory muscle use. NAD noted at this time.
Pt sleeping in BH room. Respirations even and unlabored, no accessory muscle use. NAD noted at this time. Vitals as noted. Pt awaiting re-assessment when awake.
Pt sleeping in  bed. Respirations even and unlabored, no accessory muscle use. Vitals as noted. NAD noted at this time.

## 2023-06-22 NOTE — ED BEHAVIORAL HEALTH ASSESSMENT NOTE - NSBHMSESPEECH_PSY_A_CORE
initially, reported to be yelling/ screaming. now, unable to assess as he is asleep initially, reported to be yelling/ screaming. now, unable to assess as he is asleep. ON RE-EVALUATION AT 5:55PM:  NORMAL

## 2023-12-26 PROBLEM — I10 ESSENTIAL (PRIMARY) HYPERTENSION: Chronic | Status: ACTIVE | Noted: 2022-10-01

## 2023-12-26 PROBLEM — E11.9 TYPE 2 DIABETES MELLITUS WITHOUT COMPLICATIONS: Chronic | Status: ACTIVE | Noted: 2022-10-01

## 2024-02-12 NOTE — ED ADULT NURSE NOTE - THOUGHT CONTENT
Goal Outcome Evaluation:    Orientation: A&Ox4  Activity: SBA  Diet/BS Checks: regular diet, 1 bout of nausea and emesis. PRN zofran effective  Tele: ST  IV Access/Drains: PIV infusing NS at 75mL/hr with intermittent IV rocephin  Pain Management: Denies  Abnormal VS/Results: VSS on 2L NC at rest and 5-6L with activity, LOPZE. Tachy. LS diminished and congested/productive cough. Abdominal CT today- results pending   Bowel/Bladder: WNL  Skin/Wounds: Scattered bruising  Consults: Pulm, IR  D/C Disposition: pending progress/plan  Other information: transitioned to PO prednisone today. Preliminary sputum culture - MD notified and changed oral abx to bactrim, will begin this evening.    Obsessions/Preoccupations